# Patient Record
Sex: FEMALE | Race: WHITE | NOT HISPANIC OR LATINO | URBAN - NONMETROPOLITAN AREA
[De-identification: names, ages, dates, MRNs, and addresses within clinical notes are randomized per-mention and may not be internally consistent; named-entity substitution may affect disease eponyms.]

---
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
## 2024-06-12 NOTE — PATIENT DISCUSSION
Chief Complaint:  Chief Complaint   Patient presents with    Neurologic Problem     Follow up        History of Present Illness/Interval History:  This is a 31 yo F with PMH significant for asthma, anxiety, depression, and CKD who presents for f/u of paresthesia of feet and perineum.     No new sxs    Past Medical History:  Past Medical History:   Diagnosis Date    Anxiety     Asthma (CMD)     Chronic kidney disease     ,  \"2 kidey failure incidients\" stones    Depressive disorder     Encounter for smoking cessation counseling 10/17/2022        Meds:  Current Outpatient Medications   Medication Sig Dispense Refill    albuterol 108 (90 Base) MCG/ACT inhaler Inhale 2 puffs into the lungs every 4 hours as needed for Shortness of Breath or Wheezing. 1 each 2    gabapentin (NEURONTIN) 300 MG capsule Take 1 capsule by mouth nightly. 90 capsule 1    tizanidine (ZANAFLEX) 4 MG capsule Take 1 capsule by mouth 3 times daily as needed for Muscle spasms. 30 capsule 0    diphenhydrAMINE (BENADRYL) 25 MG capsule Take 25 mg by mouth every 6 hours as needed for Itching.      EPINEPHrine 0.3 MG/0.3ML auto-injector Inject 0.3 mLs into the muscle 1 time as needed for Anaphylaxis. 2 each 1     No current facility-administered medications for this visit.        Allergies:  ALLERGIES:   Allergen Reactions    Cat Dander HIVES     Contact hives    Erythromycin RASH     Per mother fever and rash    Sulfa Antibiotics RASH     Per mother fever and rash         Social History:  Social History     Socioeconomic History    Marital status: Single     Spouse name: Not on file    Number of children: Not on file    Years of education: Not on file    Highest education level: Not on file   Occupational History    Not on file   Tobacco Use    Smoking status: Former     Current packs/day: 0.00     Types: Cigarettes     Quit date: 2022     Years since quittin.4    Smokeless tobacco: Never   Vaping Use    Vaping status: never used  Posterior Vitreous Detachment: I have discussed the diagnosis of PVD with the patient and the possibility of a retinal tear or detachment. The signs/symptoms of a retinal tear/detachment were reviewed to include but not limited to redness, discharge, pain, increase or change in flashes of light, increase or change in floaters, decreased vision, part of the vision missing, veils or any other ocular concerns. I have further explained not all patients who develop a tear or detachment have notable symptoms, therefore, compliance with return visits are necessary. The patient was instructed to contact us immediately if they noticed any of the signs or symptoms of retinal detachment as noted above as the prognosis for any potential treatment options may be time related. Return for follow-up as scheduled.   Substance and Sexual Activity    Alcohol use: Yes     Comment: once a month    Drug use: Yes     Frequency: 7.0 times per week     Types: Marijuana     Comment: daily    Sexual activity: Yes     Partners: Male     Birth control/protection: Condom, None     Comment: sometimes they use condoms   Other Topics Concern    Not on file   Social History Narrative    Not on file     Social Determinants of Health     Financial Resource Strain: Not on file   Food Insecurity: Not on file   Transportation Needs: Not on file   Physical Activity: Not on file   Stress: Not on file   Social Connections: Not on file   Interpersonal Safety: Not on file (2022)        Family History:  Family History   Problem Relation Age of Onset    Thyroid Mother           Imagin24 MRI brain wow:  IMPRESSION:  1.  Small 4 mm nonenhancing T2 hyperintensity in the right cerebellum,  nonspecific and possibly chronic sequelae of remote insult/ischemia. A  solitary focus of demyelination could appear similar. Consider follow-up  MRI in 6 months to document stability. If there is clinical concern for  demyelinating process consider including cervical and thoracic MRI on  follow-up.  2.  Small 8 mm T1 hyperintense pituitary mass may represent a microadenoma  or craniopharyngioma. Recommend dedicated contrast-enhanced MRI pituitary  for further characterization.    Assessment:  This is a 31 yo F with PMH significant for asthma, anxiety, depression, and CKD who presents for f/u of paresthesia of feet and perineum. Sxs resolved but presents today to go over MRI brain obtained to r/o central cause of symptoms. It shows a solitary lesion of possible demyelination in the right cerebellum which I would like to follow up in 6 months (MRI brain and C spine wow). MRI pituitary now. Referral to endo.     Follow up in 6 months     Total time spent reviewing records, interviewing examining counseling patient, coordinating care and documenting 20 minutes.       Sabrina Guevara MD